# Patient Record
Sex: MALE | Race: BLACK OR AFRICAN AMERICAN | NOT HISPANIC OR LATINO
[De-identification: names, ages, dates, MRNs, and addresses within clinical notes are randomized per-mention and may not be internally consistent; named-entity substitution may affect disease eponyms.]

---

## 2020-11-09 ENCOUNTER — APPOINTMENT (OUTPATIENT)
Dept: FAMILY MEDICINE | Facility: CLINIC | Age: 45
End: 2020-11-09

## 2021-05-03 ENCOUNTER — APPOINTMENT (OUTPATIENT)
Dept: CARDIOLOGY | Facility: CLINIC | Age: 46
End: 2021-05-03
Payer: COMMERCIAL

## 2021-05-03 ENCOUNTER — NON-APPOINTMENT (OUTPATIENT)
Age: 46
End: 2021-05-03

## 2021-05-03 VITALS
SYSTOLIC BLOOD PRESSURE: 120 MMHG | WEIGHT: 184 LBS | TEMPERATURE: 98.1 F | HEIGHT: 71 IN | RESPIRATION RATE: 16 BRPM | HEART RATE: 61 BPM | BODY MASS INDEX: 25.76 KG/M2 | DIASTOLIC BLOOD PRESSURE: 82 MMHG

## 2021-05-03 DIAGNOSIS — R00.2 PALPITATIONS: ICD-10-CM

## 2021-05-03 PROCEDURE — 93000 ELECTROCARDIOGRAM COMPLETE: CPT

## 2021-05-03 PROCEDURE — 99203 OFFICE O/P NEW LOW 30 MIN: CPT

## 2021-05-03 PROCEDURE — 99072 ADDL SUPL MATRL&STAF TM PHE: CPT

## 2021-05-03 NOTE — HISTORY OF PRESENT ILLNESS
[FreeTextEntry1] : Patient presents to the office today for evaluation of palpitations.  He states that he been going on for at least the last 8 years but have been worse over the last 2 weeks.  They have been occurring often at night but also during the day.  It is occurring at least every other day and lasts for varying periods of time.  He has checked his heart rate at times and noticed it to be in the low 100s.  There is no dizziness or lightheadedness associated with palpitations and no other symptoms.  He reports no other symptoms at all.  He is otherwise active and able to do activity without any other limitations.  Patient denies chest pain, shortness of breath, orthopnea, presyncope, syncope.

## 2021-05-03 NOTE — ASSESSMENT
[FreeTextEntry1] : EKG: Sinus rhythm with no significant ST or T wave changes.  Incomplete right bundle branch block.\par \par 45-year-old man with no significant past medical history presents to me for evaluation of palpitations.  Patient's palpitations have been going on for a long time and the cause is unclear but stress and anxiety may be likely causes.  His EKG is unremarkable.  I will plan a Holter monitor to further evaluate look for any evidence of arrhythmia.  His blood pressure is well controlled.  Lab work is old but he had new blood work drawn today and I will get a copy.  He otherwise appears to be generally low risk from a cardiovascular standpoint.

## 2021-05-03 NOTE — DISCUSSION/SUMMARY
[FreeTextEntry1] : 1.  Check echocardiogram to evaluate his palpitations and mildly abnormal EKG.\par 2.  Check 1 week Holter monitor to further evaluate his palpitations and look for any evidence of arrhythmia.\par 3.  No additional cardiac medications at this time.\par 4.  Encourage patient to work on ways to reduce stress.\par 5.  Follow up here after testing, and will make further recommendations at that time.

## 2021-05-03 NOTE — PHYSICAL EXAM
[Well Developed] : well developed [Well Nourished] : well nourished [No Acute Distress] : no acute distress [Normal Conjunctiva] : normal conjunctiva [Normal Venous Pressure] : normal venous pressure [No Carotid Bruit] : no carotid bruit [Normal S1, S2] : normal S1, S2 [No Murmur] : no murmur [No Rub] : no rub [No Gallop] : no gallop [Clear Lung Fields] : clear lung fields [Good Air Entry] : good air entry [No Respiratory Distress] : no respiratory distress  [Soft] : abdomen soft [Non Tender] : non-tender [No Masses/organomegaly] : no masses/organomegaly [Normal Bowel Sounds] : normal bowel sounds [Normal Gait] : normal gait [No Edema] : no edema [No Cyanosis] : no cyanosis [No Clubbing] : no clubbing [No Varicosities] : no varicosities [Moves all extremities] : moves all extremities [No Focal Deficits] : no focal deficits [Normal Speech] : normal speech [Alert and Oriented] : alert and oriented [Normal memory] : normal memory

## 2021-06-21 ENCOUNTER — APPOINTMENT (OUTPATIENT)
Dept: CARDIOLOGY | Facility: CLINIC | Age: 46
End: 2021-06-21

## 2021-07-12 ENCOUNTER — APPOINTMENT (OUTPATIENT)
Dept: CARDIOLOGY | Facility: CLINIC | Age: 46
End: 2021-07-12

## 2024-08-16 ENCOUNTER — APPOINTMENT (OUTPATIENT)
Dept: ORTHOPEDIC SURGERY | Facility: CLINIC | Age: 49
End: 2024-08-16
Payer: MEDICAID

## 2024-08-16 VITALS — HEIGHT: 71 IN | BODY MASS INDEX: 24.5 KG/M2 | WEIGHT: 175 LBS

## 2024-08-16 DIAGNOSIS — M75.02 ADHESIVE CAPSULITIS OF LEFT SHOULDER: ICD-10-CM

## 2024-08-16 DIAGNOSIS — Z78.9 OTHER SPECIFIED HEALTH STATUS: ICD-10-CM

## 2024-08-16 PROCEDURE — 73030 X-RAY EXAM OF SHOULDER: CPT | Mod: LT

## 2024-08-16 PROCEDURE — 99203 OFFICE O/P NEW LOW 30 MIN: CPT

## 2024-08-16 PROCEDURE — 73562 X-RAY EXAM OF KNEE 3: CPT | Mod: LT

## 2024-08-16 PROCEDURE — 73010 X-RAY EXAM OF SHOULDER BLADE: CPT | Mod: LT

## 2024-08-16 RX ORDER — NAPROXEN 500 MG/1
500 TABLET ORAL
Qty: 20 | Refills: 0 | Status: ACTIVE | COMMUNITY
Start: 2024-08-16 | End: 1900-01-01

## 2024-08-16 NOTE — HISTORY OF PRESENT ILLNESS
[8] : 8 [1] : 2 [Dull/Aching] : dull/aching [Sharp] : sharp [Intermittent] : intermittent [Leisure] : leisure [Meds] : meds [de-identified] : 8/16/24: Pt is here for left middle finger, shoulder and knee pain. Pt states he hears a cracking noise in his knee and has pain in the shoulder that started about 6 months ago. no specific injury. knee stiffness after being in one position for extended periods. Pt has been taking Naproxen daily for the shoulder [] : no

## 2024-08-16 NOTE — DISCUSSION/SUMMARY
[Medication Risks Reviewed] : Medication risks reviewed [de-identified] :  Plan for PT Rx: Naproxen If pain persist can f/u for possible CSI inj of L shoulder  ----------------------------------------------------------------------------   All relevant imaging studies pertinent to today's visit, including x-rays, MRI's and/or other advanced imaging studies (CT/etc) were independently interpreted and reviewed with the patient as needed. Implications of the studies together with the patient's clinical picture were discussed to formulate a working diagnosis and management options were detailed.   The patient and/or guardian was advised of the diagnosis.  The natural history of the pathology was explained in full. All questions were answered.  The risks and benefits of conservative and interventional treatment alternatives were explained to the patient  The patient and/or guardian was advised if any advanced diagnostic/imaging study (MRI/CT/etc) is ordered to evaluate potential pathology in the affected area(s), they should follow up in the office to review the results of the study and determine further management that may be indicated.  ----------------------------------------------------------------------------  Patient warned of specific risks of medication related to bleeding, GI issues, increase blood pressure, and cardiac risks in addition to additional risks.  Patient advised to discuss with PMD  if any presence of stated issues.

## 2024-08-16 NOTE — IMAGING
[Left] : left knee [There are no fractures, subluxations or dislocations. No significant abnormalities are seen] : There are no fractures, subluxations or dislocations. No significant abnormalities are seen [de-identified] :   ----------------------------------------------------------------------------   Left shoulder exam:   Inspection: no obvious deformity, no obvious masses, no swelling, no effusion, no atrophy ROM:    FF: 150 guarding v 180    ER: 45 v 60    IR: T12  Tenderness:    (+) Anterior/Biceps:    (-) Posterior    (-) Lateral    (+) Trapezius    (-) Scapula    (-) AC joint    (-) Crepitus with ROM Stability:    (-) Translation    (-) Apprehension    (-) Clicking Additional tests:    (-) Lili's    (+) Guillermina's    (-) Mckay's    (-) Speed    (-) Cross chest adduction Strength:    FF: 5/5    ER: 5/5    IR: 5/5    Biceps: 5/5    Triceps: 5/5    Distal: 5/5 Neuro: In tact to light touch throughout Vascularity: Extremity warm and well perfused   --------------------------------------------------------------  Left Knee Exam:                             General: no distress, no ligamentous laxity Skin: no significant pertinent finding Inspection:  Effusion: (-)  Malalignment: (-)  Swelling: (-)  Quad atrophy: (-)  J-sign: (-) ROM:  0 - 135 degrees of flexion. Tenderness:  MJLT: (-)  MFC. (-)  LJLT: (-)  Medial patellar facet tenderness: -  Lateral patellar facet tenderness: -  Crepitus: (+)  Patellar grind tenderness: (-)  Patellar tendon: (-) Stability:  Lachman: (-)  Varus/Valgus instability: (-)  Posterior drawer: (-)  Patellar translation: wnl Additional tests:  McMurrays test: (-)  Patellar apprehension: (-)  Patellar tilt: (-)  Tight lateral retinaculum: (-) Strength: 5/5 Q/H/TA/GS/EHL Neuro: In tact to light touch throughout, DTR's wnl Vascularity: Extremity warm and well perfused Gait: non antalgic

## 2024-09-27 ENCOUNTER — APPOINTMENT (OUTPATIENT)
Dept: ORTHOPEDIC SURGERY | Facility: CLINIC | Age: 49
End: 2024-09-27